# Patient Record
Sex: FEMALE | Race: ASIAN | NOT HISPANIC OR LATINO
[De-identification: names, ages, dates, MRNs, and addresses within clinical notes are randomized per-mention and may not be internally consistent; named-entity substitution may affect disease eponyms.]

---

## 2018-10-05 ENCOUNTER — INBOUND DOCUMENT (OUTPATIENT)
Age: 58
End: 2018-10-05

## 2018-10-05 ENCOUNTER — EMERGENCY (EMERGENCY)
Facility: HOSPITAL | Age: 58
LOS: 1 days | Discharge: HOME | End: 2018-10-05
Admitting: EMERGENCY MEDICINE
Payer: MEDICAID

## 2018-10-05 VITALS
TEMPERATURE: 98 F | SYSTOLIC BLOOD PRESSURE: 174 MMHG | HEART RATE: 59 BPM | RESPIRATION RATE: 18 BRPM | DIASTOLIC BLOOD PRESSURE: 84 MMHG | OXYGEN SATURATION: 98 %

## 2018-10-05 VITALS
HEART RATE: 66 BPM | SYSTOLIC BLOOD PRESSURE: 143 MMHG | RESPIRATION RATE: 16 BRPM | OXYGEN SATURATION: 100 % | TEMPERATURE: 98 F | DIASTOLIC BLOOD PRESSURE: 88 MMHG

## 2018-10-05 DIAGNOSIS — Z90.710 ACQUIRED ABSENCE OF BOTH CERVIX AND UTERUS: Chronic | ICD-10-CM

## 2018-10-05 DIAGNOSIS — S32.038A OTHER FRACTURE OF THIRD LUMBAR VERTEBRA, INITIAL ENCOUNTER FOR CLOSED FRACTURE: ICD-10-CM

## 2018-10-05 DIAGNOSIS — E78.5 HYPERLIPIDEMIA, UNSPECIFIED: ICD-10-CM

## 2018-10-05 DIAGNOSIS — Z98.891 HISTORY OF UTERINE SCAR FROM PREVIOUS SURGERY: Chronic | ICD-10-CM
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
## 2018-10-05 DIAGNOSIS — M54.5 LOW BACK PAIN: ICD-10-CM
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
## 2018-10-05 PROBLEM — Z00.00 ENCOUNTER FOR PREVENTIVE HEALTH EXAMINATION: Status: ACTIVE | Noted: 2018-10-05

## 2018-10-05 LAB
APPEARANCE UR: CLEAR — SIGNIFICANT CHANGE UP
BILIRUB UR-MCNC: NEGATIVE — SIGNIFICANT CHANGE UP
COLOR SPEC: YELLOW — SIGNIFICANT CHANGE UP
DIFF PNL FLD: NEGATIVE — SIGNIFICANT CHANGE UP
GLUCOSE UR QL: NEGATIVE — SIGNIFICANT CHANGE UP
KETONES UR-MCNC: NEGATIVE — SIGNIFICANT CHANGE UP
LEUKOCYTE ESTERASE UR-ACNC: ABNORMAL
NITRITE UR-MCNC: NEGATIVE — SIGNIFICANT CHANGE UP
PH UR: 7.5 — SIGNIFICANT CHANGE UP (ref 5–8)
PROT UR-MCNC: NEGATIVE MG/DL — SIGNIFICANT CHANGE UP
SP GR SPEC: 1.01 — SIGNIFICANT CHANGE UP (ref 1–1.03)
UROBILINOGEN FLD QL: 0.2 E.U./DL — SIGNIFICANT CHANGE UP

## 2018-10-05 PROCEDURE — 72110 X-RAY EXAM L-2 SPINE 4/>VWS: CPT | Mod: 26

## 2018-10-05 PROCEDURE — 72100 X-RAY EXAM L-S SPINE 2/3 VWS: CPT | Mod: 26

## 2018-10-05 PROCEDURE — 99284 EMERGENCY DEPT VISIT MOD MDM: CPT | Mod: 25

## 2018-10-05 PROCEDURE — 72170 X-RAY EXAM OF PELVIS: CPT | Mod: 26

## 2018-10-05 RX ORDER — IBUPROFEN 200 MG
1 TABLET ORAL
Qty: 20 | Refills: 0 | OUTPATIENT
Start: 2018-10-05 | End: 2018-11-03

## 2018-10-05 RX ORDER — ACETAMINOPHEN 500 MG
650 TABLET ORAL ONCE
Qty: 0 | Refills: 0 | Status: COMPLETED | OUTPATIENT
Start: 2018-10-05 | End: 2018-10-05

## 2018-10-05 RX ORDER — DIAZEPAM 5 MG
5 TABLET ORAL ONCE
Qty: 0 | Refills: 0 | Status: DISCONTINUED | OUTPATIENT
Start: 2018-10-05 | End: 2018-10-05

## 2018-10-05 RX ORDER — KETOROLAC TROMETHAMINE 30 MG/ML
60 SYRINGE (ML) INJECTION ONCE
Qty: 0 | Refills: 0 | Status: DISCONTINUED | OUTPATIENT
Start: 2018-10-05 | End: 2018-10-05

## 2018-10-05 RX ORDER — OXYCODONE AND ACETAMINOPHEN 5; 325 MG/1; MG/1
1 TABLET ORAL ONCE
Qty: 0 | Refills: 0 | Status: DISCONTINUED | OUTPATIENT
Start: 2018-10-05 | End: 2018-10-05

## 2018-10-05 RX ADMIN — Medication 5 MILLIGRAM(S): at 11:11

## 2018-10-05 RX ADMIN — OXYCODONE AND ACETAMINOPHEN 1 TABLET(S): 5; 325 TABLET ORAL at 12:12

## 2018-10-05 RX ADMIN — Medication 60 MILLIGRAM(S): at 11:11

## 2018-10-05 RX ADMIN — Medication 650 MILLIGRAM(S): at 11:11

## 2018-10-05 NOTE — ED PROVIDER NOTE - PHYSICAL EXAMINATION
Vital Signs - nursing notes reviewed and confirmed  Gen - WDWN F, NAD, comfortable and non-toxic appearing, speaking in full sentences   Skin - warm, dry, intact  HEENT - AT/NC, PERRL, EOMI, no conjunctival injection, moist oral mucosa, TM intact b/l with good cone of lights, o/p clear with no erythema, edema, or exudate, uvula midline, airway patent, neck supple and NT, FROM  CV - S1S2, R/R/R  Resp - respiration non-labored, CTAB, symmetric bs b/l, no r/r/w  GI - NABS, soft, ND, NT, no rebound or guarding, no CVAT b/l   MS - w/w/p, R mild paraspinal tenderness in the lumbosacral region, no midline tenderness, step off, crepitus, erythema, edema, ecchymosis, or deformity, neg straight leg test. hips stable and NT, calves supple and NT, distal pulses symmetric b/l, brisk cap refills, +SILT  Neuro - AxOx3, no focal neuro deficits, CN II-XII grossly intact, cerebellar function intact, negative pronator drift, negative nystagmus, unable to weight bear in the ED

## 2018-10-05 NOTE — ED PROVIDER NOTE - DIAGNOSTIC INTERPRETATION
Xray (wet reads) interpreted by EDGAR REYNOSO   Xray L spine - no acute fx or subluxation, joint space intact, vertebral height preserved   Xray pelvis - no soft tissue swelling. no acute fx or dislocation, joint space intact, no effusion noted. No foreign body noted Xray (wet reads) interpreted by EDGAR REYNOSO   Xray L spine - +compression deformity of L3, otherwise joint space and vertebral height intact, no subluxation    Xray pelvis - no soft tissue swelling. no acute fx or dislocation, joint space intact, no effusion noted. No foreign body noted

## 2018-10-05 NOTE — ED PROVIDER NOTE - CARE PROVIDERS DIRECT ADDRESSES
[email or web address],DirectAddress_Unknown,DirectAddress_Unknown,DirectAddress_Unknown

## 2018-10-05 NOTE — ED PROVIDER NOTE - OBJECTIVE STATEMENT
57 yo F with PMHx of HLD and uterine fibroid, not on any AC/NSAIDs, presenting c/o lower back and buttock pain s/p fall.  Pt slipped at employer's home and landed on her buttock.  Noted pain to the lower back and pelvic region and unable to get up s/p fall.  Denies prodromes, head trauma, LOC, bleeding, HA, dizziness, SOB, CP, palpitations, N/V, change in ROM/sensation, abdominal/neck pain, focal weakness, change in vision/mental status/speech, paresthesia, and malaise.

## 2018-10-05 NOTE — ED PROVIDER NOTE - CARE PLAN
Principal Discharge DX:	Back contusion  Secondary Diagnosis:	Fall Principal Discharge DX:	Compression fracture of lumbar vertebra  Secondary Diagnosis:	Fall

## 2018-10-05 NOTE — ED ADULT TRIAGE NOTE - CHIEF COMPLAINT QUOTE
mandarin speaking pt biba from home s/p slip and fall hx htn and high chol mandarin speaking pt biba from home c/o lower back pain s/p mechanical fall hx htn and high chol

## 2018-10-05 NOTE — ED ADULT NURSE NOTE - NSIMPLEMENTINTERV_GEN_ALL_ED
Implemented All Universal Safety Interventions:  [place] to call system. Call bell, personal items and telephone within reach. Instruct patient to call for assistance. Room bathroom lighting operational. Non-slip footwear when patient is off stretcher. Physically safe environment: no spills, clutter or unnecessary equipment. Stretcher in lowest position, wheels locked, appropriate side rails in place.

## 2018-10-05 NOTE — ED PROVIDER NOTE - MEDICAL DECISION MAKING DETAILS
pt with mechanical fall, no associated prodromes, no focal neuro deficits, NV intact, Xray negative for acute fx or bony injury, encouraged RICE to affected region, weight bear as tolerated, f/u ortho, pt verbalized understanding. pt with mechanical fall, no associated prodromes, no focal neuro deficits, NV intact, Xray with L3 compression fx without NV compromise, Rx TLSO brace provided, pain adequately controlled in the ED, neurologically intact, ambulatory with walker, weight bear as tolerated, f/u ortho, pt verbalized understanding. pt with mechanical fall, no associated prodromes, no focal neuro deficits, NV intact, Xray with L3 compression fx without NV compromise, Rx TLSO brace provided, pain adequately controlled in the ED, neurologically intact, ambulatory with walker, weight bear as tolerated, appt made by  for f/u on 10/18, f/u ortho and pain management, pt verbalized understanding.

## 2018-10-05 NOTE — ED PROVIDER NOTE - CARE PROVIDER_API CALL
Jim Ford), Orthopedics  [address] [address]  Phone: [phone]  Fax: [phone]    Nathen Bourgeois), Anesthesiology; Pain Medicine  [address] [place] [address]  Phone: [phone]  Fax: [phone]    Mio Dozier), Anesthesiology; Pain Medicine  [address] [place] [address]  Phone: [phone]  Fax: [phone]    Randy Doll), Orthopaedic Surgery  [address] [place] [address]  Phone: [phone]  Fax: [phone]

## 2018-10-18 ENCOUNTER — APPOINTMENT (OUTPATIENT)
Dept: PHYSICAL MEDICINE AND REHAB | Facility: CLINIC | Age: 58
End: 2018-10-18

## 2018-12-06 NOTE — ED PROVIDER NOTE - CROS ED CARDIOVAS ALL NEG
[Normal] : affect appropriate [Neuro-onc exam] : PERRLA, EOMI, cranial nerves II-XII grossly intact, motor exam 5/5 throughout, sensory exam intact, normal patellar DTRs, no dysmetria, normal gait, no ataxia on tandem gait [100: Normal, no complaints, no evidence of disease.] : 100: Normal, no complaints, no evidence of disease. [de-identified] : no bruising no facal findings by orbit [de-identified] : healing umbilical incision, well healed lower mid abdomen, and left lower quadrant [de-identified] : petechiae around neck/chest from wrestling, skin rash completely resolved! negative...